# Patient Record
Sex: FEMALE | Race: WHITE | NOT HISPANIC OR LATINO | URBAN - METROPOLITAN AREA
[De-identification: names, ages, dates, MRNs, and addresses within clinical notes are randomized per-mention and may not be internally consistent; named-entity substitution may affect disease eponyms.]

---

## 2021-04-08 ENCOUNTER — APPOINTMENT (RX ONLY)
Dept: URBAN - METROPOLITAN AREA CLINIC 38 | Facility: CLINIC | Age: 61
Setting detail: DERMATOLOGY
End: 2021-04-08

## 2021-04-08 ENCOUNTER — APPOINTMENT (RX ONLY)
Dept: URBAN - METROPOLITAN AREA CLINIC 37 | Facility: CLINIC | Age: 61
Setting detail: DERMATOLOGY
End: 2021-04-08

## 2021-04-08 DIAGNOSIS — L0391 CELLULITIS AND ABSCESS OF UNSPECIFIED SITES: ICD-10-CM | Status: INADEQUATELY CONTROLLED

## 2021-04-08 DIAGNOSIS — L0390 CELLULITIS AND ABSCESS OF UNSPECIFIED SITES: ICD-10-CM | Status: INADEQUATELY CONTROLLED

## 2021-04-08 PROBLEM — L03.312 CELLULITIS OF BACK [ANY PART EXCEPT BUTTOCK]: Status: ACTIVE | Noted: 2021-04-08

## 2021-04-08 PROBLEM — L03.114 CELLULITIS OF LEFT UPPER LIMB: Status: ACTIVE | Noted: 2021-04-08

## 2021-04-08 PROCEDURE — ? ADDITIONAL NOTES

## 2021-04-08 PROCEDURE — ? PRESCRIPTION

## 2021-04-08 PROCEDURE — ? COUNSELING

## 2021-04-08 PROCEDURE — ? TREATMENT REGIMEN

## 2021-04-08 PROCEDURE — 99203 OFFICE O/P NEW LOW 30 MIN: CPT

## 2021-04-08 PROCEDURE — ? ORDER TESTS

## 2021-04-08 RX ORDER — GENTAMICIN SULFATE 1 MG/G
THIN LAYER OINTMENT TOPICAL BID
Qty: 1 | Refills: 0 | Status: ERX | COMMUNITY
Start: 2021-04-08

## 2021-04-08 RX ORDER — DOXYCYCLINE HYCLATE 100 MG/1
1 CAPSULE, GELATIN COATED ORAL BID
Qty: 28 | Refills: 0 | Status: ERX | COMMUNITY
Start: 2021-04-08

## 2021-04-08 RX ADMIN — DOXYCYCLINE HYCLATE 1: 100 CAPSULE, GELATIN COATED ORAL at 00:00

## 2021-04-08 RX ADMIN — GENTAMICIN SULFATE THIN LAYER: 1 OINTMENT TOPICAL at 00:00

## 2021-04-08 ASSESSMENT — LOCATION DETAILED DESCRIPTION DERM
LOCATION DETAILED: LEFT SUPERIOR UPPER BACK
LOCATION DETAILED: LEFT POSTERIOR SHOULDER
LOCATION DETAILED: LEFT SUPERIOR UPPER BACK
LOCATION DETAILED: INFERIOR THORACIC SPINE
LOCATION DETAILED: INFERIOR THORACIC SPINE
LOCATION DETAILED: LEFT POSTERIOR SHOULDER

## 2021-04-08 ASSESSMENT — LOCATION ZONE DERM
LOCATION ZONE: TRUNK
LOCATION ZONE: TRUNK
LOCATION ZONE: ARM
LOCATION ZONE: ARM

## 2021-04-08 ASSESSMENT — LOCATION SIMPLE DESCRIPTION DERM
LOCATION SIMPLE: LEFT SHOULDER
LOCATION SIMPLE: LEFT UPPER BACK
LOCATION SIMPLE: UPPER BACK
LOCATION SIMPLE: LEFT SHOULDER
LOCATION SIMPLE: LEFT UPPER BACK
LOCATION SIMPLE: UPPER BACK

## 2021-04-08 NOTE — PROCEDURE: ORDER TESTS
Bill For Surgical Tray: no
Lab Facility: [id number]
Performing Laboratory: [id number]
Expected Date Of Service: 04/08/2021
Billing Type: Third-Party Bill

## 2021-04-08 NOTE — HPI: EVALUATION OF SKIN LESION(S)
What Type Of Note Output Would You Prefer (Optional)?: Standard Output
Hpi Title: Evaluation of Skin Lesions
How Severe Are Your Spot(S)?: mild
Have Your Spot(S) Been Treated In The Past?: has not been treated
Additional History: Pt states she was seen at the ED yesterday and given Keflex QID for a dx of cellulitis. She states she is a . Theses bumps have been coming and going for approx 18 months. She states they start as a small fluid filled bump and she pops it and picks until it becomes a sore.

## 2021-04-08 NOTE — HPI: EVALUATION OF SKIN LESION(S)
What Type Of Note Output Would You Prefer (Optional)?: Standard Output
How Severe Are Your Spot(S)?: mild
Have Your Spot(S) Been Treated In The Past?: has not been treated
Hpi Title: Evaluation of Skin Lesions
Additional History: Pt states she was seen at the ED yesterday and given Keflex QID for a dx of cellulitis. She states she is a . Theses bumps have been coming and going for approx 18 months. She states they start as a small fluid filled bump and she pops it and picks until it becomes a sore.

## 2021-04-08 NOTE — PROCEDURE: ADDITIONAL NOTES
Additional Notes: Patient admits to nervous habit of picking her skin and she has sores present on her back, chest, face, and arms.
Render Risk Assessment In Note?: no
Detail Level: Simple

## 2021-04-08 NOTE — PROCEDURE: TREATMENT REGIMEN
Plan: Recheck in 1 week\\n\\nCulture performed today. \\n\\nAdvised patient to seek immediate medical attention if she develops fever, chills, sweats, or other systemic symptoms.
Detail Level: Zone
Initiate Treatment: doxycycline hyclate 100 mg capsule - take 1 capsule by mouth twice daily x14 days.\\ngentamicin 0.1 % topical ointment - apply to affected areas twice daily x14 days
Discontinue Regimen: Advised patient NOT to  Keflex from the pharmacy and take doxycycline instead as it covers for MRSA. Detailed instructions were written down for patient.

## 2021-04-08 NOTE — PROCEDURE: ADDITIONAL NOTES
Render Risk Assessment In Note?: no
Detail Level: Simple
Additional Notes: Patient admits to nervous habit of picking her skin and she has sores present on her back, chest, face, and arms.

## 2021-04-16 ENCOUNTER — APPOINTMENT (RX ONLY)
Dept: URBAN - METROPOLITAN AREA CLINIC 37 | Facility: CLINIC | Age: 61
Setting detail: DERMATOLOGY
End: 2021-04-16

## 2021-04-16 ENCOUNTER — APPOINTMENT (RX ONLY)
Dept: URBAN - METROPOLITAN AREA CLINIC 38 | Facility: CLINIC | Age: 61
Setting detail: DERMATOLOGY
End: 2021-04-16

## 2021-04-16 DIAGNOSIS — L98429 CHRONIC ULCER OF OTHER SPECIFIED SITES: ICD-10-CM | Status: IMPROVED

## 2021-04-16 DIAGNOSIS — L98419 CHRONIC ULCER OF OTHER SPECIFIED SITES: ICD-10-CM | Status: IMPROVED

## 2021-04-16 PROBLEM — L98.499 NON-PRESSURE CHRONIC ULCER OF SKIN OF OTHER SITES WITH UNSPECIFIED SEVERITY: Status: ACTIVE | Noted: 2021-04-16

## 2021-04-16 PROCEDURE — ? TREATMENT REGIMEN

## 2021-04-16 PROCEDURE — ? COUNSELING

## 2021-04-16 NOTE — PROCEDURE: TREATMENT REGIMEN
Detail Level: Zone
Plan: Recheck in 3 weeks or sooner if condition worsens.
Continue Regimen: continue gentamicin Ointment twice daily and finish course of doxycycline

## 2021-04-16 NOTE — PROCEDURE: TREATMENT REGIMEN
Detail Level: Zone
Continue Regimen: continue gentamicin Ointment twice daily and finish course of doxycycline
Plan: Recheck in 3 weeks or sooner if condition worsens.

## 2023-09-22 NOTE — PROCEDURE: TREATMENT REGIMEN
Discontinue Regimen: Advised patient NOT to  Keflex from the pharmacy and take doxycycline instead as it covers for MRSA. Detailed instructions were written down for patient.
Initiate Treatment: doxycycline hyclate 100 mg capsule - take 1 capsule by mouth twice daily x14 days.\\ngentamicin 0.1 % topical ointment - apply to affected areas twice daily x14 days
Detail Level: Zone
Plan: Recheck in 1 week\\n\\nCulture performed today. \\n\\nAdvised patient to seek immediate medical attention if she develops fever, chills, sweats, or other systemic symptoms.
Picato Counseling:  I discussed with the patient the risks of Picato including but not limited to erythema, scaling, itching, weeping, crusting, and pain.